# Patient Record
Sex: FEMALE | URBAN - METROPOLITAN AREA
[De-identification: names, ages, dates, MRNs, and addresses within clinical notes are randomized per-mention and may not be internally consistent; named-entity substitution may affect disease eponyms.]

---

## 2023-10-15 RX ORDER — IBUPROFEN 600 MG/1
TABLET ORAL
COMMUNITY
Start: 2023-05-04

## 2023-10-15 RX ORDER — .BETA.-CAROTENE, ASCORBIC ACID, CHOLECALCIFEROL, .ALPHA.-TOCOPHEROL ACETATE, DL-, THIAMINE, RIBOFLAVIN, NIACINAMIDE, PYRIDOXINE HYDROCHLORIDE, FOLIC ACID, CYANOCOBALAMIN, CALCIUM PANTOTHENATE, CALCIUM CARBONATE, FERROUS FUMARATE, ZINC OXIDE AND DOCUSATE SODIUM 1000; 100; 400; 30; 3; 3; 15; 20; 1; 12; 7; 200; 29; 20; 25 [IU]/1; MG/1; [IU]/1; MG/1; MG/1; MG/1; MG/1; MG/1; MG/1; UG/1; MG/1; MG/1; MG/1; MG/1; MG/1
TABLET ORAL
COMMUNITY
Start: 2023-05-04

## 2023-10-15 RX ORDER — SENNOSIDES 8.6 MG/1
TABLET ORAL
COMMUNITY
Start: 2023-05-04

## 2023-10-15 RX ORDER — ACETAMINOPHEN 500 MG
TABLET ORAL
COMMUNITY
Start: 2023-05-04

## 2023-10-15 RX ORDER — FAMOTIDINE 20 MG/1
1 TABLET, FILM COATED ORAL 2 TIMES DAILY
COMMUNITY
Start: 2023-04-19

## 2023-10-15 RX ORDER — POLYETHYLENE GLYCOL 3350 17 G/17G
POWDER, FOR SOLUTION ORAL
COMMUNITY
Start: 2023-05-04

## 2023-10-17 ENCOUNTER — ALLIED HEALTH (OUTPATIENT)
Dept: INTEGRATIVE MEDICINE | Facility: CLINIC | Age: 28
End: 2023-10-17
Payer: COMMERCIAL

## 2023-10-17 DIAGNOSIS — M99.02 SOMATIC DYSFUNCTION OF THORACIC REGION: ICD-10-CM

## 2023-10-17 DIAGNOSIS — M99.01 SOMATIC DYSFUNCTION OF CERVICAL REGION: Primary | ICD-10-CM

## 2023-10-17 DIAGNOSIS — M79.9 POSTURAL STRAIN: ICD-10-CM

## 2023-10-17 DIAGNOSIS — M25.551 PAIN IN RIGHT HIP: ICD-10-CM

## 2023-10-17 DIAGNOSIS — M54.59 MECHANICAL LOW BACK PAIN: ICD-10-CM

## 2023-10-17 DIAGNOSIS — M25.511 PAIN IN SHOULDER REGION, RIGHT: ICD-10-CM

## 2023-10-17 DIAGNOSIS — M54.2 CERVICALGIA: ICD-10-CM

## 2023-10-17 DIAGNOSIS — M99.03 SOMATIC DYSFUNCTION OF LUMBAR REGION: ICD-10-CM

## 2023-10-17 DIAGNOSIS — M99.04 SACROILIAC JOINT SOMATIC DYSFUNCTION: ICD-10-CM

## 2023-10-17 PROCEDURE — 99203 OFFICE O/P NEW LOW 30 MIN: CPT | Performed by: CHIROPRACTOR

## 2023-10-17 PROCEDURE — 98941 CHIROPRACT MANJ 3-4 REGIONS: CPT | Performed by: CHIROPRACTOR

## 2023-10-17 PROCEDURE — 97112 NEUROMUSCULAR REEDUCATION: CPT | Performed by: CHIROPRACTOR

## 2023-10-17 ASSESSMENT — ENCOUNTER SYMPTOMS
TROUBLE SWALLOWING: 0
CONSTIPATION: 0
CONFUSION: 0
ABDOMINAL PAIN: 0
JOINT SWELLING: 0
FATIGUE: 0
FEVER: 0
DIARRHEA: 0
CHEST TIGHTNESS: 0
FREQUENCY: 0

## 2023-10-17 NOTE — PROGRESS NOTES
Subjective   Patient ID: Gi Estrada is a 27 y.o. female who presents today for R>L tension throughout spine and hips.    1/12 [facility] - 10/17/2023: Patient is presenting today for initial evaluation and management of chronic tension throughout the spine, nose more so on the right.  Symptoms have been present for several years, but recently made worse after the birth and subsequent  of her 5-month-old infant.  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  Symptoms are greater on the right and mostly described as sore and stiff.  She denies radicular symptoms.  She does notice she is experienced headaches located near the glabella of late.  She denies ominous symptoms with her headaches.  She has received chiropractic care in the past, most recently about 9 months ago, which did provide some relief.      Review of Systems   Constitutional:  Negative for fatigue and fever.   HENT:  Negative for trouble swallowing.    Eyes:  Negative for visual disturbance.   Respiratory:  Negative for chest tightness.    Cardiovascular:  Negative for chest pain and leg swelling.   Gastrointestinal:  Negative for abdominal pain, constipation and diarrhea.   Genitourinary:  Negative for frequency.   Musculoskeletal:  Negative for joint swelling.   Neurological:  Negative for syncope.   Psychiatric/Behavioral:  Negative for confusion.    All other systems reviewed and are negative.      Objective     The patient is alert and oriented x 3. [facility].  Cranial nerves II-XII are grossly intact. No difficulty with transitional movements is observed.  Rounded shoulders, R>L. Elevated right shoulder. Elevated left iliac crest.  Leg length is symmetric.  No deficits observed when analyzing gait.  No scarring is present.  No evidence of muscle wasting.    Moderate to severe hypertonicity and tenderness is present in the  suboccipitals, cervical paraspinals,  upper trapezius, levator scapulae, rhomboids, right posterior shoulder girdle, right pec minor, thoracic paraspinals, lumbar paraspinals, upper gluteals, hamstrings, psoas, rectus femoris, hip adductors.  Findings are mostly right-sided.    Segmental joint dysfunction was assessed with motion palpation and is identified in the following areas:  Cervical: C4/5, C5/6  Thoracic: T3/4, T4/5, T6/7  Lumbopelvic: L4/5, L5/S1, Right SI      Cervical spine range of motion is reduced by 10% and painful in left side bending and left rotation.  Lumbar spine range of motion is WFL.  Hip abuction mildly reduced on right.    Upper extremity reflexes are diffusely graded 2+.  Lower extremity reflexes are diffusely graded 2+.    Upper extremity myotomal examination is normal.  Lower extremity myotomal examination is normal, with reproduction of thigh tenderness on the right with hip flexion testing.  Full squat performed without pain or dysfunction.    Foraminal compression test is negative.  Cervical distraction test is negative.  Kemps test is negative bilaterally.  Yeomans test is provocative on right.  Nachlas test is negative bilaterally.    Assessment/Plan   The patient's presentation today is consistent with sternal strain and muscle imbalance alongside somatic dysfunction contributing to her pain syndrome.  Neurologically she is intact and I do not identify any contraindications to a trial of care.  We will implement a brief trial of care to include myofascial techniques such as active release techniques to the right pectoralis minor, instrument assisted soft tissue mobilization to the posterior shoulder girdle, and manual stretching to the hip musculature.  We will also utilize manipulative therapy including drop table and diversified techniques.  We will closely monitor response care to see if changes need to occur.    Today's treatment:  Drop-table manipulation applied to the: right  SI joint  Diversified manipulation applied to the involved cervical, thoracic and lumbar segments.  Set-up tolerated in cervical spine prior to performing manipulation.    Neuromuscular re-education: ART applied to right pec minor. IASTM with movement was applied to the right upper trapezius and right levator scapulae.    Start time for neuromuscular re-education was 11:50 am and ending time was 12:00 pm.     Treatment Plan:   The patient and I discussed the risks and benefits of chiropractic care. Based on the patient's subjective complaints along with the examination findings, it is advised that a course of chiropractic treatment be initiated. We will implement a brief trial of care to include myofascial techniques such as active release techniques to the right pectoralis minor, instrument assisted soft tissue mobilization to the posterior shoulder girdle, and manual stretching to the hip musculature.  We will also utilize manipulative therapy including drop table and diversified techniques.  The goals of care include: Improve postural strength.  The patient tolerated today's treatment with little or no additional discomfort and was instructed to contact the office for questions or concerns. They were advised on the potential for post-treatment soreness.   Will see patient at a frequency of one  per week/every other week for 4 visits (Visit 1/4).  We will closely monitor their response to care to determine if any changes need to occur, if advanced imaging is needed, or if referral to other providers is appropriate.     Please note: Voice to text software was used when completing this note. While the note was proofread, portions may include grammatical errors. Please contact me with any questions/concerns as it relates to these types of errors.

## 2023-10-24 ENCOUNTER — ALLIED HEALTH (OUTPATIENT)
Dept: INTEGRATIVE MEDICINE | Facility: CLINIC | Age: 28
End: 2023-10-24
Payer: COMMERCIAL

## 2023-10-24 DIAGNOSIS — M99.01 SOMATIC DYSFUNCTION OF CERVICAL REGION: Primary | ICD-10-CM

## 2023-10-24 DIAGNOSIS — M99.02 SOMATIC DYSFUNCTION OF THORACIC REGION: ICD-10-CM

## 2023-10-24 DIAGNOSIS — M54.59 MECHANICAL LOW BACK PAIN: ICD-10-CM

## 2023-10-24 DIAGNOSIS — M25.511 PAIN IN SHOULDER REGION, RIGHT: ICD-10-CM

## 2023-10-24 DIAGNOSIS — M25.551 PAIN IN RIGHT HIP: ICD-10-CM

## 2023-10-24 DIAGNOSIS — M79.9 POSTURAL STRAIN: ICD-10-CM

## 2023-10-24 DIAGNOSIS — M54.2 CERVICALGIA: ICD-10-CM

## 2023-10-24 DIAGNOSIS — M99.04 SACROILIAC JOINT SOMATIC DYSFUNCTION: ICD-10-CM

## 2023-10-24 DIAGNOSIS — M99.03 SOMATIC DYSFUNCTION OF LUMBAR REGION: ICD-10-CM

## 2023-10-24 PROCEDURE — 98941 CHIROPRACT MANJ 3-4 REGIONS: CPT | Performed by: CHIROPRACTOR

## 2023-10-24 PROCEDURE — 97112 NEUROMUSCULAR REEDUCATION: CPT | Performed by: CHIROPRACTOR

## 2023-10-24 ASSESSMENT — ENCOUNTER SYMPTOMS
CONFUSION: 0
CHEST TIGHTNESS: 0
FREQUENCY: 0
JOINT SWELLING: 0
CONSTIPATION: 0
DIARRHEA: 0
TROUBLE SWALLOWING: 0
FATIGUE: 0
FEVER: 0
ABDOMINAL PAIN: 0

## 2023-10-24 NOTE — PROGRESS NOTES
Subjective   Patient ID: Gi Estrada is a 27 y.o. female who presents today for  R>L tension throughout spine and hips.     2/12 [facility]    HPI -patient reports mild soreness from initial visit.  Other than that there is any significant change.  She notes feeling instability in the anterior shoulder on her right when she is attempting the doorway stretch, and reports that she will notices that the arm is overhead with some force on it in the plane of extension.  This is been going on for several years, since she was playing water polo.    10/17/2023: Patient is presenting today for initial evaluation and management of chronic tension throughout the spine, nose more so on the right.  Symptoms have been present for several years, but recently made worse after the birth and subsequent  of her 5-month-old infant.  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  Symptoms are greater on the right and mostly described as sore and stiff.  She denies radicular symptoms.  She does notice she is experienced headaches located near the glabella of late.  She denies ominous symptoms with her headaches.  She has received chiropractic care in the past, most recently about 9 months ago, which did provide some relief.     Review of Systems   Constitutional:  Negative for fatigue and fever.   HENT:  Negative for trouble swallowing.    Eyes:  Negative for visual disturbance.   Respiratory:  Negative for chest tightness.    Cardiovascular:  Negative for chest pain and leg swelling.   Gastrointestinal:  Negative for abdominal pain, constipation and diarrhea.   Genitourinary:  Negative for frequency.   Musculoskeletal:  Negative for joint swelling.   Neurological:  Negative for syncope.   Psychiatric/Behavioral:  Negative for confusion.    All other systems reviewed and are negative.      Objective     The patient is  alert and oriented x 3. [facility].  Cranial nerves II-XII are grossly intact. No difficulty with transitional movements is observed.  Rounded shoulders, R>L. Elevated right shoulder. Elevated left iliac crest.  Leg length is symmetric.  No deficits observed when analyzing gait.  No scarring is present.  No evidence of muscle wasting.     Moderate to severe hypertonicity and tenderness is present in the suboccipitals, cervical paraspinals,  upper trapezius, levator scapulae, rhomboids, right posterior shoulder girdle, right pec minor, thoracic paraspinals, lumbar paraspinals, upper gluteals, hamstrings, psoas, rectus femoris, hip adductors.  Findings are mostly right-sided.     Segmental joint dysfunction was assessed with motion palpation and is identified in the following areas:  Cervical: C4/5, C5/6  Thoracic: T3/4, T4/5, T6/7  Lumbopelvic: L4/5, L5/S1, Right SI      (Cervical spine range of motion is reduced by 10% and painful in left side bending and left rotation.  Lumbar spine range of motion is WFL.  Hip abuction mildly reduced on right.)     (Upper extremity reflexes are diffusely graded 2+.  Lower extremity reflexes are diffusely graded 2+.)     (Upper extremity myotomal examination is normal.  Lower extremity myotomal examination is normal, with reproduction of thigh tenderness on the right with hip flexion testing.  Full squat performed without pain or dysfunction.(     (Foraminal compression test is negative.  Cervical distraction test is negative.  Kemps test is negative bilaterally.  Yeomans test is provocative on right.  Nachlas test is negative bilaterally.)    Assessment/Plan   Subjective complaints related to the right shoulder are indicative of some instability, likely related to the labrum.  We discussed alternatives to the doorway stretch such as changing angles and not going as deep with it.  We also discussed variations to push-up exercises.  As far as treatment alternatives we did bring up  orthopedic consult, but also discussed potential referral for physical therapy.    (The patient's presentation today is consistent with sternal strain and muscle imbalance alongside somatic dysfunction contributing to her pain syndrome.  Neurologically she is intact and I do not identify any contraindications to a trial of care.  We will implement a brief trial of care to include myofascial techniques such as active release techniques to the right pectoralis minor, instrument assisted soft tissue mobilization to the posterior shoulder girdle, and manual stretching to the hip musculature.  We will also utilize manipulative therapy including drop table and diversified techniques.  We will closely monitor response care to see if changes need to occur. )    Today's treatment:  Drop-table manipulation applied to the: right SI joint  Diversified manipulation applied to the involved cervical, thoracic and lumbar segments.  Set-up tolerated in cervical spine prior to performing manipulation.     Neuromuscular re-education: ART applied to right pec minor. IASTM with movement was applied to the right upper trapezius and right levator scapulae.   Start time for neuromuscular re-education was 8:45 am and ending time was 9:00 am     Treatment Plan:   The patient tolerated today's treatment with little or no additional discomfort and was instructed to contact the office for questions or concerns. Will see patient at a frequency of one visit per week for 4 visits (Visit 2/4).  We will closely monitor their response to care to determine if any changes need to occur, if advanced imaging is needed, or if referral to other providers is appropriate.     Please note: Voice to text software was used when completing this note. While the note was proofread, portions may include grammatical errors. Please contact me with any questions/concerns as it relates to these types of errors.

## 2023-10-31 ENCOUNTER — APPOINTMENT (OUTPATIENT)
Dept: INTEGRATIVE MEDICINE | Facility: CLINIC | Age: 28
End: 2023-10-31
Payer: COMMERCIAL